# Patient Record
(demographics unavailable — no encounter records)

---

## 2025-01-10 NOTE — HEALTH RISK ASSESSMENT
[Yes] : Yes [Never] : Never [With Family] : lives with family [Employed] : employed [] :  [Very Good] : ~his/her~  mood as very good [Monthly or less (1 pt)] : Monthly or less (1 point) [1 or 2 (0 pts)] : 1 or 2 (0 points) [Never (0 pts)] : Never (0 points) [No] : In the past 12 months have you used drugs other than those required for medical reasons? No [No falls in past year] : Patient reported no falls in the past year [0] : 2) Feeling down, depressed, or hopeless: Not at all (0) [PHQ-2 Negative - No further assessment needed] : PHQ-2 Negative - No further assessment needed [Patient reported colonoscopy was normal] : Patient reported colonoscopy was normal [HIV Test offered] : HIV Test offered [Hepatitis C test offered] : Hepatitis C test offered [None] : None [# Of Children ___] : has [unfilled] children [Sexually Active] : sexually active [Fully functional (bathing, dressing, toileting, transferring, walking, feeding)] : Fully functional (bathing, dressing, toileting, transferring, walking, feeding) [Fully functional (using the telephone, shopping, preparing meals, housekeeping, doing laundry, using] : Fully functional and needs no help or supervision to perform IADLs (using the telephone, shopping, preparing meals, housekeeping, doing laundry, using transportation, managing medications and managing finances) [Seat Belt] :  uses seat belt [Patient/Caregiver not ready to engage] : , patient/caregiver not ready to engage [de-identified] : occasionally  [Audit-CScore] : 1 [[id number]] : 0 [Change in mental status noted] : No change in mental status noted [Language] : denies difficulty with language [Reasoning] : denies difficulty with reasoning [ColonoscopyDate] : 06/2023

## 2025-01-10 NOTE — REVIEW OF SYSTEMS
[Fever] : no fever [Chills] : no chills [Chest Pain] : no chest pain [Palpitations] : no palpitations [Shortness Of Breath] : no shortness of breath [Cough] : no cough [Abdominal Pain] : no abdominal pain [Nausea] : no nausea [Skin Rash] : no skin rash [Headache] : no headache

## 2025-01-10 NOTE — ASSESSMENT
[FreeTextEntry1] : #annual well exam -46yr old M with PMHx of ulcerative colitis presents for annual exam. No acute complaints or symptoms today.  -vitals stable in office  -EKG wnl  -UTD on flu vaccine  -UTD on colonoscopy screen  -order CBC, CMP, lipid panel, A1c, TSH, f/u results  -order STD testing  -f/u in 1yr for next annual exam   #anxiety -uses clonazepam 1mg prn -needs med refill   #UC -chronic and stable -taking sterlara injection managed by GI   Obesity - interested in considering GLP-1 - has had difficulty losing weight despite diet and exercise regimen - needs GI clearance

## 2025-01-10 NOTE — PHYSICAL EXAM
2.22 [No Acute Distress] : no acute distress [Well Nourished] : well nourished [Well Developed] : well developed [Well-Appearing] : well-appearing [Normal Outer Ear/Nose] : the outer ears and nose were normal in appearance [Normal Oropharynx] : the oropharynx was normal [No Respiratory Distress] : no respiratory distress  [No Accessory Muscle Use] : no accessory muscle use [Clear to Auscultation] : lungs were clear to auscultation bilaterally [Normal Rate] : normal rate  [Regular Rhythm] : with a regular rhythm [Normal S1, S2] : normal S1 and S2 [Soft] : abdomen soft [Non Tender] : non-tender [Non-distended] : non-distended [No Masses] : no abdominal mass palpated [Normal Anterior Cervical Nodes] : no anterior cervical lymphadenopathy [No Rash] : no rash [Coordination Grossly Intact] : coordination grossly intact [No Focal Deficits] : no focal deficits [Normal Affect] : the affect was normal [Normal Insight/Judgement] : insight and judgment were intact [Supple] : supple

## 2025-01-10 NOTE — HISTORY OF PRESENT ILLNESS
[FreeTextEntry1] : annual well exam  [de-identified] : 46yr old M with PMHx of ulcerative colitis presents for annual well exam. Pt without symptoms or complaints today. Pt UTD on vaccinations including Tdap. Pt's colonoscopy was in 2023 for hx of UC and will be getting repeat testing this year. Pt reports balanced diet and exercises occasionally. Pt requesting STD testing today.   Recent weight gain.  Working on changing diet and exercise.  Would like to consider GLP-1.

## 2025-05-09 NOTE — HISTORY OF PRESENT ILLNESS
[FreeTextEntry1] : obesity [de-identified] : 46yr old M with PMHx of ulcerative colitis presents for weight check. Currently on wegovy 0.25 mg q 1week. Tried wegovy 0.5mg q 1 week 1.5 months ago and was complaining of side effects of heart burn so decreased back to 0.25mg q 1week.   Recent weight gain. Working on changing diet and exercise. Would like to consider alternative med options.

## 2025-05-09 NOTE — PLAN
[FreeTextEntry1] : Obesity - patient not tolerating wegovy due to side effects - unable to progess past 0.25mg dose and has plateaued. - will trial switch to zepbound - follow up one month

## 2025-05-09 NOTE — HISTORY OF PRESENT ILLNESS
[FreeTextEntry1] : obesity [de-identified] : 46yr old M with PMHx of ulcerative colitis presents for weight check. Currently on wegovy 0.25 mg q 1week. Tried wegovy 0.5mg q 1 week 1.5 months ago and was complaining of side effects of heart burn so decreased back to 0.25mg q 1week.   Recent weight gain. Working on changing diet and exercise. Would like to consider alternative med options.